# Patient Record
(demographics unavailable — no encounter records)

---

## 2025-04-07 NOTE — REASON FOR VISIT
[Telehealth (audio & video)] : This visit was provided via telehealth using real-time 2-way audio visual technology. [Consultation] : a consultation visit [Verbal consent obtained from patient] : the patient, [unfilled]

## 2025-04-07 NOTE — ASSESSMENT
[FreeTextEntry1] : The privilege of seeing her as a request of my colleague who her gastroenterologist with this finding she had a 3 staged procedure and then 1 year it took her to feel good then she felt good to 3 years to have issues with the vaginal fistulization started.  Finally, 2 to 3 years ago she was suffering so much in the ileostomy had to be given.  She is not keen on having a permanent ileostomy would like to consider the possibility of a redo pouch.  I think is very fair.  I do not know exactly she actually truly has a Crohn's disease where it took 1 year for her to feel better about the things.  Plus the MRI that was done last year shows additional findings of presacral sinuses where this can be a surgical complication with a late presentation.  As long as she understands the uncertainty of the and the coverage of the Biologics I have no problem trying to do a redo pouch.  First and foremost we need to start with a MRI of the pelvis exam under anesthesia flexible pouchoscopy and give her the options and proceed accordingly.  I spent 30 minutes time face-to-face where all risk benefits morbidity complications were explained she consents for the procedure.

## 2025-04-07 NOTE — PHYSICAL EXAM
[No Rash or Lesion] : No rash or lesion [Alert] : alert [Oriented to Person] : oriented to person [Oriented to Place] : oriented to place [Calm] : calm [de-identified] : +stoma [de-identified] : wdwn female, nad [de-identified] : nc/at [facility] [de-identified] : no c/c/e noted

## 2025-04-07 NOTE — HISTORY OF PRESENT ILLNESS
[FreeTextEntry1] : Referred by Dr. 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 Evidence of extensive fecal matter suggest inadequate diversion. Unable to identify intraluminal fistula opening.  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 persistent enhancing fistula between anterior anus and lower vagina without associated abscess. Inflammatory changes of the ileal pouch with suspected sinus tracts between the ileal pouch and presacral soft tissue thickening. No drainable fluid collection. Mild inflammation of a short segment of small bowel just proximal to the ileal pouch. No small bowel obstruction.  She was dx when she was 30 - surgery was done in  - done in 3 stages. Dr. Lopez ([facility]). Her ileostomy was closed about 3 months after surgery. It took her a good year to be somewhat normal. then she felt ok for a period of time - She started vaginal draining - about 2-3 years after surgery. She had a seton put in.  She now has an ileostomy (for the past 2 years). Previously on Stelara and RInvoq the past 2 years. Rinvoq is helpful. History of medications include Humira and Remicade. She has 1 child  - difficult birth (forceps and episiotomy)  prior to having her ileostomy she had a year of horrible pain and suffered greatly.